# Patient Record
Sex: FEMALE
[De-identification: names, ages, dates, MRNs, and addresses within clinical notes are randomized per-mention and may not be internally consistent; named-entity substitution may affect disease eponyms.]

---

## 2018-04-25 PROBLEM — Z00.00 ENCOUNTER FOR PREVENTIVE HEALTH EXAMINATION: Status: ACTIVE | Noted: 2018-04-25

## 2018-12-10 ENCOUNTER — APPOINTMENT (OUTPATIENT)
Dept: OBGYN | Facility: CLINIC | Age: 58
End: 2018-12-10
Payer: COMMERCIAL

## 2018-12-10 VITALS
BODY MASS INDEX: 23.3 KG/M2 | HEIGHT: 66 IN | DIASTOLIC BLOOD PRESSURE: 78 MMHG | SYSTOLIC BLOOD PRESSURE: 126 MMHG | WEIGHT: 145 LBS

## 2018-12-10 DIAGNOSIS — Z85.3 PERSONAL HISTORY OF MALIGNANT NEOPLASM OF BREAST: ICD-10-CM

## 2018-12-10 DIAGNOSIS — I61.9 NONTRAUMATIC INTRACEREBRAL HEMORRHAGE, UNSPECIFIED: ICD-10-CM

## 2018-12-10 DIAGNOSIS — Z86.79 PERSONAL HISTORY OF OTHER DISEASES OF THE CIRCULATORY SYSTEM: ICD-10-CM

## 2018-12-10 DIAGNOSIS — Z78.9 OTHER SPECIFIED HEALTH STATUS: ICD-10-CM

## 2018-12-10 DIAGNOSIS — Z82.49 FAMILY HISTORY OF ISCHEMIC HEART DISEASE AND OTHER DISEASES OF THE CIRCULATORY SYSTEM: ICD-10-CM

## 2018-12-10 DIAGNOSIS — Z83.3 FAMILY HISTORY OF DIABETES MELLITUS: ICD-10-CM

## 2018-12-10 DIAGNOSIS — Z92.3 PERSONAL HISTORY OF IRRADIATION: ICD-10-CM

## 2018-12-10 DIAGNOSIS — Z86.39 PERSONAL HISTORY OF OTHER ENDOCRINE, NUTRITIONAL AND METABOLIC DISEASE: ICD-10-CM

## 2018-12-10 DIAGNOSIS — Z87.42 PERSONAL HISTORY OF OTHER DISEASES OF THE FEMALE GENITAL TRACT: ICD-10-CM

## 2018-12-10 DIAGNOSIS — E55.9 VITAMIN D DEFICIENCY, UNSPECIFIED: ICD-10-CM

## 2018-12-10 PROCEDURE — 99213 OFFICE O/P EST LOW 20 MIN: CPT

## 2018-12-11 PROBLEM — Z83.3 FAMILY HISTORY OF TYPE 2 DIABETES MELLITUS: Status: ACTIVE | Noted: 2018-12-11

## 2018-12-11 PROBLEM — Z78.9 NON-SMOKER: Status: ACTIVE | Noted: 2018-12-11

## 2018-12-11 PROBLEM — I61.9 HEMORRHAGIC STROKE: Status: RESOLVED | Noted: 2018-12-11 | Resolved: 2018-12-11

## 2018-12-11 PROBLEM — Z78.9 NO HISTORY OF ALCOHOL USE: Status: ACTIVE | Noted: 2018-12-11

## 2018-12-11 PROBLEM — Z78.9 DOES NOT USE ILLICIT DRUGS: Status: ACTIVE | Noted: 2018-12-11

## 2018-12-11 PROBLEM — Z86.39 HISTORY OF HYPOTHYROIDISM: Status: RESOLVED | Noted: 2018-12-11 | Resolved: 2018-12-11

## 2018-12-11 PROBLEM — Z82.49 FAMILY HISTORY OF HYPERTENSION: Status: ACTIVE | Noted: 2018-12-11

## 2018-12-11 PROBLEM — E55.9 VITAMIN D INSUFFICIENCY: Status: RESOLVED | Noted: 2018-12-11 | Resolved: 2018-12-11

## 2018-12-11 PROBLEM — Z92.3 H/O THERAPEUTIC RADIATION: Status: RESOLVED | Noted: 2018-12-11 | Resolved: 2018-12-11

## 2018-12-11 PROBLEM — Z85.3 HISTORY OF MALIGNANT NEOPLASM OF BREAST: Status: RESOLVED | Noted: 2018-12-11 | Resolved: 2018-12-11

## 2018-12-11 PROBLEM — Z86.79 HISTORY OF HYPERTENSION: Status: RESOLVED | Noted: 2018-12-11 | Resolved: 2018-12-11

## 2018-12-11 PROBLEM — Z87.42 HISTORY OF OVARIAN CYST: Status: ACTIVE | Noted: 2018-12-11

## 2018-12-11 RX ORDER — ANASTROZOLE TABLETS 1 MG/1
1 TABLET ORAL
Refills: 0 | Status: ACTIVE | COMMUNITY
Start: 2018-12-11

## 2018-12-11 RX ORDER — LEVOTHYROXINE SODIUM 112 UG/1
112 TABLET ORAL
Refills: 0 | Status: ACTIVE | COMMUNITY
Start: 2018-12-11

## 2018-12-11 RX ORDER — UBIDECARENONE/VIT E ACET 100MG-5
50 MCG CAPSULE ORAL
Refills: 0 | Status: ACTIVE | COMMUNITY
Start: 2018-12-11

## 2018-12-11 RX ORDER — OLMESARTAN MEDOXOMIL 20 MG/1
20 TABLET, FILM COATED ORAL
Refills: 0 | Status: ACTIVE | COMMUNITY
Start: 2018-12-11

## 2020-09-18 ENCOUNTER — APPOINTMENT (OUTPATIENT)
Dept: ENDOCRINOLOGY | Facility: CLINIC | Age: 60
End: 2020-09-18
Payer: COMMERCIAL

## 2020-09-18 VITALS
DIASTOLIC BLOOD PRESSURE: 84 MMHG | HEART RATE: 82 BPM | WEIGHT: 142 LBS | OXYGEN SATURATION: 100 % | HEIGHT: 66 IN | RESPIRATION RATE: 18 BRPM | BODY MASS INDEX: 22.82 KG/M2 | SYSTOLIC BLOOD PRESSURE: 157 MMHG

## 2020-09-18 DIAGNOSIS — R94.6 ABNORMAL RESULTS OF THYROID FUNCTION STUDIES: ICD-10-CM

## 2020-09-18 PROCEDURE — 99204 OFFICE O/P NEW MOD 45 MIN: CPT | Mod: 25

## 2020-09-18 PROCEDURE — 36415 COLL VENOUS BLD VENIPUNCTURE: CPT

## 2020-09-18 NOTE — ASSESSMENT
[FreeTextEntry1] : - repeat thyroid panel, antibodies today\par - celiac screening\par - she will require an EGD/colonoscopy. Advised discussing with GI doing a GES\par - will switch to Tirosint post labs\par - screening thyroid US\par - advised to obtain DXA report for review\par - calcium/D supplements\par RTC 2 months

## 2020-09-18 NOTE — HISTORY OF PRESENT ILLNESS
[FreeTextEntry1] : 59 year female  referred for hypothyroidism management. \par Ms. ALLRED  was diagnosed with hypothyroidism about 15 years ago. She's been on a stable dose of 100 mcg of Synthroid for several years. Last year, after traveling to [place], she developed chronic diarrhea, bloating, and abdominal pain. She reports normal abdominal MRI, but has not had an EGD yet. Her stool sample was negative for O+P, but was deficient in elastase and she was started on Kreon.  In May , she checked her TSH which was 0.264, and she has stopped the medication for about  a week with a significant resolution of her symptoms. Then she resumed 50 mcg dose, and the symptoms recurred. SHe has stopped the medication for about 10 days in July, and her TSH from 08/01/20 was 95. At that point she resumed her Synthroid 50 mcg, and TSH from 8/24/20 was 49.2. She's been on Synthroid 75 mcg past 3-4 weeks, and some of the symptoms are back again. \par Denies family history of thyroid cancer or history of radiation exposure to head and neck area in a childhood, however she's an immigrant from [facility] (possibly affected by [place] fallout).\par a1c 6.2 <-- 6.1\par She reports a DXA done last year showing an osteopenia.\par

## 2020-09-18 NOTE — CONSULT LETTER
[Dear  ___] : Dear  [unfilled], [Sincerely,] : Sincerely, [FreeTextEntry1] : Thank you for referring  Ms. TENZIN ALLRED to me for evaluation and treatment. Please, see attached consultation note. As always, if there are specific questions you would like to discuss, please feel free to contact me.\par Thank you for the courtesy of this evaluation.\par  [FreeTextEntry3] : Antoine Pandey MD, FACE, EC[facility]\par

## 2020-09-24 RX ORDER — LEVOTHYROXINE SODIUM 88 UG/1
88 CAPSULE ORAL DAILY
Qty: 30 | Refills: 6 | Status: ACTIVE | COMMUNITY
Start: 2020-09-24 | End: 1900-01-01

## 2020-12-01 ENCOUNTER — APPOINTMENT (OUTPATIENT)
Dept: ENDOCRINOLOGY | Facility: CLINIC | Age: 60
End: 2020-12-01
Payer: COMMERCIAL

## 2020-12-01 DIAGNOSIS — E03.9 HYPOTHYROIDISM, UNSPECIFIED: ICD-10-CM

## 2020-12-01 DIAGNOSIS — E04.2 NONTOXIC MULTINODULAR GOITER: ICD-10-CM

## 2020-12-01 PROCEDURE — 99213 OFFICE O/P EST LOW 20 MIN: CPT | Mod: 95

## 2020-12-01 NOTE — ASSESSMENT
[FreeTextEntry1] : - repeat thyroid panel\par - f/u celiac screening\par - she will require an EGD/colonoscopy. Advised discussing with GI doing a GES\par - cont  Tirosint 88 mcg, pending labs\par - repeat thyroid US in 4/21\par - advised to obtain DXA report for review\par - calcium/D supplements\par RTC 3-6 months, depending on labs

## 2020-12-01 NOTE — HISTORY OF PRESENT ILLNESS
[Home] : at home, [unfilled] , at the time of the visit. [Medical Office: ([facility])___] : at the medical office located in  [Verbal consent obtained from patient] : the patient, [unfilled] [FreeTextEntry1] : 59 year female  f/u for hypothyroidism management. \par \par *** Televisit  Dec 01, 2020 ***\par \par on Tirosint 88 mcg\par feels better overall. stomach pain has subsided drastically. lost 5 lbs\par no recent  labs\par prior TSH- 4.89, FT4- 1.8\par \par Thyr US (9/30/20)- RLP hypo solid 0.6x0.4x0.5 (no incr vascularity), LUP hypo 0.6x0.4x0.5 (no increased vascularity)\par \par HPI:\par Ms. ALLRED  was diagnosed with hypothyroidism about 15 years ago. She's been on a stable dose of 100 mcg of Synthroid for several years. Last year, after traveling to [place], she developed chronic diarrhea, bloating, and abdominal pain. She reports normal abdominal MRI, but has not had an EGD yet. Her stool sample was negative for O+P, but was deficient in elastase and she was started on Kreon.  In May , she checked her TSH which was 0.264, and she has stopped the medication for about  a week with a significant resolution of her symptoms. Then she resumed 50 mcg dose, and the symptoms recurred. SHe has stopped the medication for about 10 days in July, and her TSH from 08/01/20 was 95. At that point she resumed her Synthroid 50 mcg, and TSH from 8/24/20 was 49.2. She's been on Synthroid 75 mcg past 3-4 weeks, and some of the symptoms are back again. \par Denies family history of thyroid cancer or history of radiation exposure to head and neck area in a childhood, however she's an immigrant from [facility] (possibly affected by [place] fallout).\par a1c 6.2 <-- 6.1\par She reports a DXA done last year showing an osteopenia.\par